# Patient Record
Sex: FEMALE | Race: BLACK OR AFRICAN AMERICAN | NOT HISPANIC OR LATINO | URBAN - METROPOLITAN AREA
[De-identification: names, ages, dates, MRNs, and addresses within clinical notes are randomized per-mention and may not be internally consistent; named-entity substitution may affect disease eponyms.]

---

## 2019-03-20 ENCOUNTER — EMERGENCY (EMERGENCY)
Facility: HOSPITAL | Age: 44
LOS: 1 days | Discharge: HOME | End: 2019-03-20
Attending: EMERGENCY MEDICINE | Admitting: EMERGENCY MEDICINE
Payer: COMMERCIAL

## 2019-03-20 VITALS
HEIGHT: 62 IN | SYSTOLIC BLOOD PRESSURE: 178 MMHG | HEART RATE: 69 BPM | RESPIRATION RATE: 19 BRPM | DIASTOLIC BLOOD PRESSURE: 108 MMHG | OXYGEN SATURATION: 98 % | TEMPERATURE: 98 F | WEIGHT: 149.91 LBS

## 2019-03-20 DIAGNOSIS — Z98.890 OTHER SPECIFIED POSTPROCEDURAL STATES: Chronic | ICD-10-CM

## 2019-03-20 PROCEDURE — 99284 EMERGENCY DEPT VISIT MOD MDM: CPT

## 2019-03-20 PROCEDURE — 70450 CT HEAD/BRAIN W/O DYE: CPT

## 2019-03-20 PROCEDURE — 99284 EMERGENCY DEPT VISIT MOD MDM: CPT | Mod: 25

## 2019-03-20 PROCEDURE — 70450 CT HEAD/BRAIN W/O DYE: CPT | Mod: 26

## 2019-03-20 RX ADMIN — Medication 0.2 MILLIGRAM(S): at 23:28

## 2019-03-20 NOTE — ED PROVIDER NOTE - ENMT, MLM
Airway patent, Nasal mucosa clear. Mouth with normal mucosa. Throat has no vesicles, no oropharyngeal exudates and uvula is midline. Normal TM's bilaterally. Small frontal ecchymosis.

## 2019-03-20 NOTE — ED ADULT TRIAGE NOTE - CHIEF COMPLAINT QUOTE
"I fell out of the bed and hit my forehead"  patient has c/o dizziness and headache.  hx of HTN, denies vision changes and noted red bump on her forehead

## 2019-03-20 NOTE — ED PROVIDER NOTE - OBJECTIVE STATEMENT
45 y/o F pt w/ PMHx HTN presents to the ED c/o head injury x this morning. Pt states she rolled out of bed this morning and fell to the floor hitting her forehead on hard-wood floor. States she feels a burning pain where she hit her forehead that has been getting worse throughout the day. Denies LOC, vomiting, visual changes or other injuries.

## 2019-03-20 NOTE — ED PROVIDER NOTE - NS_ ATTENDINGSCRIBEDETAILS _ED_A_ED_FT
Frantz Chong MD - The scribe's documentation has been prepared under my direction and personally reviewed by me in its entirety. I confirm that the note above accurately reflects all work, treatment, procedures, and medical decision making performed by me.

## 2019-03-20 NOTE — ED ADULT NURSE NOTE - OBJECTIVE STATEMENT
Pt ambulated into the ED this evening for a fall from this morning. Pt states she fell out of bed at 0700 onto a hardwood floor. Pt then went to work. Pt is now c/o feeling dizzy, reports tenderness at site of injury, small abrasion on forehead. Pt denies any LOC, no N/V, ambulated into ED with steady gait. Pt is now awake, alert and conversant with clear speech.

## 2019-03-20 NOTE — ED ADULT NURSE REASSESSMENT NOTE - NS ED NURSE REASSESS COMMENT FT1
Pt with elevated BP at time of d/c. Pt states she did not take her prescribed antihypertensive this am. Pt does not know the name of her medication. Pt denies any CP, no diff breathing. MD Chong aware of BP and PO med ordered.

## 2019-03-20 NOTE — ED ADULT NURSE NOTE - PMH
No pertinent past medical history <<----- Click to add NO pertinent Past Medical History Hypertension

## 2019-03-21 VITALS
RESPIRATION RATE: 15 BRPM | SYSTOLIC BLOOD PRESSURE: 161 MMHG | DIASTOLIC BLOOD PRESSURE: 100 MMHG | HEART RATE: 65 BPM | OXYGEN SATURATION: 99 %